# Patient Record
Sex: MALE | Race: WHITE | Employment: UNEMPLOYED | URBAN - METROPOLITAN AREA
[De-identification: names, ages, dates, MRNs, and addresses within clinical notes are randomized per-mention and may not be internally consistent; named-entity substitution may affect disease eponyms.]

---

## 2017-04-04 ENCOUNTER — TRANSFERRED RECORDS (OUTPATIENT)
Dept: HEALTH INFORMATION MANAGEMENT | Facility: CLINIC | Age: 22
End: 2017-04-04

## 2017-09-29 NOTE — PROGRESS NOTES
"  SUBJECTIVE:   Rex Rivera is a 22 year old male who presents to clinic today for the following health issues:  {Provider please address medication reconciliation discrepancies--rooming staff please delete if no med/rec issues}      {Superlists:173671}    {additional problems for provider to add:296277}    Problem list and histories reviewed & adjusted, as indicated.  Additional history: {NONE - AS DOCUMENTED:071841::\"as documented\"}    {HIST REVIEW/ LINKS 2:869372}    Reviewed and updated as needed this visit by clinical staff       Reviewed and updated as needed this visit by Provider         {PROVIDER CHARTING PREFERENCE:125087}    "

## 2017-10-04 ENCOUNTER — TELEPHONE (OUTPATIENT)
Dept: FAMILY MEDICINE | Facility: CLINIC | Age: 22
End: 2017-10-04

## 2017-10-04 ENCOUNTER — OFFICE VISIT (OUTPATIENT)
Dept: FAMILY MEDICINE | Facility: CLINIC | Age: 22
End: 2017-10-04
Payer: COMMERCIAL

## 2017-10-04 VITALS
DIASTOLIC BLOOD PRESSURE: 82 MMHG | RESPIRATION RATE: 16 BRPM | TEMPERATURE: 98.5 F | SYSTOLIC BLOOD PRESSURE: 110 MMHG | OXYGEN SATURATION: 99 % | WEIGHT: 161.2 LBS | HEIGHT: 71 IN | HEART RATE: 87 BPM | BODY MASS INDEX: 22.57 KG/M2

## 2017-10-04 DIAGNOSIS — Z00.01 ENCOUNTER FOR ROUTINE ADULT HEALTH EXAMINATION WITH ABNORMAL FINDINGS: Primary | ICD-10-CM

## 2017-10-04 DIAGNOSIS — R63.0 DECREASED APPETITE: ICD-10-CM

## 2017-10-04 DIAGNOSIS — R41.840 DIFFICULTY CONCENTRATING: ICD-10-CM

## 2017-10-04 DIAGNOSIS — D69.6 THROMBOCYTOPENIA (H): ICD-10-CM

## 2017-10-04 DIAGNOSIS — G47.9 SLEEP DISTURBANCE: ICD-10-CM

## 2017-10-04 LAB
BASOPHILS # BLD AUTO: 0 10E9/L (ref 0–0.2)
BASOPHILS NFR BLD AUTO: 0.4 %
DIFFERENTIAL METHOD BLD: ABNORMAL
EOSINOPHIL # BLD AUTO: 0.1 10E9/L (ref 0–0.7)
EOSINOPHIL NFR BLD AUTO: 2.5 %
ERYTHROCYTE [DISTWIDTH] IN BLOOD BY AUTOMATED COUNT: 11.9 % (ref 10–15)
HCT VFR BLD AUTO: 43.2 % (ref 40–53)
HGB BLD-MCNC: 15.3 G/DL (ref 13.3–17.7)
LYMPHOCYTES # BLD AUTO: 2 10E9/L (ref 0.8–5.3)
LYMPHOCYTES NFR BLD AUTO: 42.7 %
MCH RBC QN AUTO: 32.8 PG (ref 26.5–33)
MCHC RBC AUTO-ENTMCNC: 35.4 G/DL (ref 31.5–36.5)
MCV RBC AUTO: 93 FL (ref 78–100)
MONOCYTES # BLD AUTO: 0.6 10E9/L (ref 0–1.3)
MONOCYTES NFR BLD AUTO: 12.1 %
NEUTROPHILS # BLD AUTO: 2 10E9/L (ref 1.6–8.3)
NEUTROPHILS NFR BLD AUTO: 42.3 %
PLATELET # BLD AUTO: 117 10E9/L (ref 150–450)
RBC # BLD AUTO: 4.67 10E12/L (ref 4.4–5.9)
WBC # BLD AUTO: 4.7 10E9/L (ref 4–11)

## 2017-10-04 PROCEDURE — 80050 GENERAL HEALTH PANEL: CPT | Performed by: FAMILY MEDICINE

## 2017-10-04 PROCEDURE — 99385 PREV VISIT NEW AGE 18-39: CPT | Performed by: FAMILY MEDICINE

## 2017-10-04 PROCEDURE — 36415 COLL VENOUS BLD VENIPUNCTURE: CPT | Performed by: FAMILY MEDICINE

## 2017-10-04 RX ORDER — DEXTROAMPHETAMINE SACCHARATE, AMPHETAMINE ASPARTATE MONOHYDRATE, DEXTROAMPHETAMINE SULFATE AND AMPHETAMINE SULFATE 7.5; 7.5; 7.5; 7.5 MG/1; MG/1; MG/1; MG/1
30 CAPSULE, EXTENDED RELEASE ORAL
COMMUNITY

## 2017-10-04 ASSESSMENT — ANXIETY QUESTIONNAIRES
7. FEELING AFRAID AS IF SOMETHING AWFUL MIGHT HAPPEN: SEVERAL DAYS
3. WORRYING TOO MUCH ABOUT DIFFERENT THINGS: NEARLY EVERY DAY
GAD7 TOTAL SCORE: 9
GAD7 TOTAL SCORE: 9
4. TROUBLE RELAXING: NOT AT ALL
GAD7 TOTAL SCORE: 9
5. BEING SO RESTLESS THAT IT IS HARD TO SIT STILL: NOT AT ALL
2. NOT BEING ABLE TO STOP OR CONTROL WORRYING: MORE THAN HALF THE DAYS
7. FEELING AFRAID AS IF SOMETHING AWFUL MIGHT HAPPEN: SEVERAL DAYS
1. FEELING NERVOUS, ANXIOUS, OR ON EDGE: NEARLY EVERY DAY
6. BECOMING EASILY ANNOYED OR IRRITABLE: NOT AT ALL

## 2017-10-04 ASSESSMENT — PATIENT HEALTH QUESTIONNAIRE - PHQ9
10. IF YOU CHECKED OFF ANY PROBLEMS, HOW DIFFICULT HAVE THESE PROBLEMS MADE IT FOR YOU TO DO YOUR WORK, TAKE CARE OF THINGS AT HOME, OR GET ALONG WITH OTHER PEOPLE: VERY DIFFICULT
SUM OF ALL RESPONSES TO PHQ QUESTIONS 1-9: 7
SUM OF ALL RESPONSES TO PHQ QUESTIONS 1-9: 7

## 2017-10-04 ASSESSMENT — PAIN SCALES - GENERAL: PAINLEVEL: NO PAIN (0)

## 2017-10-04 NOTE — PROGRESS NOTES
SUBJECTIVE:   CC: Rex Rivera is an 22 year old male who presents for preventative health visit with friend.     HPI   Physical   Annual:     Getting at least 3 servings of Calcium per day::  Yes    Bi-annual eye exam::  NO    Dental care twice a year::  Yes    Sleep apnea or symptoms of sleep apnea::  None    Diet::  Breakfast skipped and Other    Frequency of exercise::  4-5 days/week    Duration of exercise::  Greater than 60 minutes    Taking medications regularly::  Yes    Medication side effects::  Other    Additional concerns today::  YES    ADHD  Patient feels that his Adderall helps him live day to day, but he doesn't feel like it makes him feel better overall. He is not taking it every day. Patient is open to changing his medication He was diagnosed in 2nd grade, and he started medication in high school. Patient talked to a provider about ADHD and then he started treatment, however he is not sure if he had a formal evaluation completed. Patient has noticed that he will wakeup feeling productive to organize items at times. He has been having trouble sleeping for 4 years sleeping about 3 hours per night. He remembers staying up at night late till 2:00am when he was a child. His friend would like for him to finish his degree and get a part time job. She has noticed since he moved in with them that he stays up all night and sleeps during the day while they are at work. She is wondering if smoking cannabis affects how he feels. He has stopped smoking cannabis as of today. Patient relates that he does not like to drink alcohol because it does not make him feel good. Reports his parents had issues with alcohol. He does eat dinner with them. She is concerned about him.     Skin   Patient has a lump on the left side of his neck.     HENT  He feels pain in specific areas of his head at times that waxes and weans. It feels like he has to stop was he is doing to close his eyes.     Patient experiences ringing in his  ears for about a year. He has not noticed hearing issues. He listens to music.     Cardiovascular  He reports occasional issues with chest discomfort. He plays baseball and this is not an issue then. It can happen at rest. No history of heart issues.     Illness  Patient expresses having heat spells at baseball practice since before this summer. He would feel nauseas. He was able to breath through his discomfort and sometimes having to vomit helped.         Today's PHQ-2 Score:   PHQ-2 ( 1999 Pfizer) 10/4/2017   Q1: Little interest or pleasure in doing things 1   Q2: Feeling down, depressed or hopeless 1   PHQ-2 Score 2       Abuse: Current or Past(Physical, Sexual or Emotional)- No  Do you feel safe in your environment - Yes    Social History   Substance Use Topics     Smoking status: Never Smoker     Smokeless tobacco: Never Used     Alcohol use Yes      Comment: occasionally          Standardized Alcohol Screening Questionnaire  AUDIT   Questions 0 1 2 3 4 Score   1. How often do you have a drink  containing alcohol? Never Monthly or less 2 to 4  times a  month 2 to 3  times a  week 4 or more  times a  week  0   2. How many drinks containing alcohol  do you have on a typical day when you are drinking? 1 or 2 3 or 4 5 or 6 7 to 9 10 or more  0   3. How often do you have more than five  or more drinks on one occasion? Never Less  than  monthly Monthly Weekly Daily or  almost  daily  0   4. How often during the last year have  you found that you were not able to stop drinking once you had started? Never Less  than  monthly Monthly Weekly Daily or  almost  daily  0   5. How often during the last year have  you failed to do what was normally expected of you because of drinking? Never Less  than  monthly Monthly Weekly Daily or  almost  daily  0   6. How often during the last year have  you needed a first drink in the morning to get yourself going after a heavy drinking session? Never Less  than  monthly Monthly Weekly  Daily or  almost  daily  0   7. How often during the last year have you had a feeling of guilt or remorse after drinking? Never Less  than  monthly Monthly Weekly Daily or  almost  daily  0   8. How often during the last year have  you been unable to remember what happened the night before because of your drinking? Never Less  than  monthly Monthly Weekly Daily or  almost  daily  0   9. Have you or someone else been  injured because of your drinking? No  Yes, but not in the last year  Yes,  during the  last year  0   10. Has a relative, friend, doctor or other health care worker been concerned about your drinking or suggested you cut down? No  Yes, but not in the last year  Yes,  during the  last year  0   Total  0   Scoring: A score of 7 for adult men is an indication of hazardous drinking (risk for physical or physiological harm); a score of 8 or more is an indication of an alcohol use disorder. A score of 5 or more for adult women  is an indication of hazardous drinking or an alcohol use disorder.         Last PSA: No results found for: PSA    Reviewed orders with patient. Reviewed health maintenance and updated orders accordingly - Yes  BP Readings from Last 3 Encounters:   10/04/17 110/82    Wt Readings from Last 3 Encounters:   10/04/17 73.1 kg (161 lb 3.2 oz)   02/01/02 25.5 kg (56 lb 4 oz) (83 %)*     * Growth percentiles are based on CDC 2-20 Years data.                      Reviewed and updated as needed this visit by clinical staffTobacco  Allergies  Meds  Med Hx  Surg Hx  Fam Hx  Soc Hx        Reviewed and updated as needed this visit by Provider        History reviewed. No pertinent past medical history.   Past Surgical History:   Procedure Laterality Date     CYSTECTOMY PILONIDAL         ROS:  C: NEGATIVE for fever, chills, change in weight  I: NEGATIVE for worrisome rashes, moles or lesions. See HPI above.   E: NEGATIVE for vision changes or irritation  ENT: NEGATIVE for mouth and throat  "problems. POSITIVE for ear problems. See HPI above.   R: NEGATIVE for significant cough or SOB. See HPI above.   CV: POSITIVE for chest pain. No palpitations  GI: NEGATIVE for abdominal pain, heartburn, or change in bowel habits. POSITIVE for nausea. See HPI above.    male: negative for dysuria, hematuria, decreased urinary stream, erectile dysfunction, urethral discharge  M: NEGATIVE for significant arthralgias or myalgia  N: NEGATIVE for weakness, dizziness or paresthesias. See HPI above.   P: POSITIVE for changes in mood or affect. See HPI above.     This document serves as a record of the services and decisions personally performed and made by Arielle Nice MD. It was created on her behalf by Chio Brown, a trained medical scribe. The creation of this document is based on the provider's statements to the medical scribe.  Chio Brown 3:36 PM October 4, 2017    OBJECTIVE:   /82  Pulse 87  Temp 98.5  F (36.9  C) (Temporal)  Resp 16  Ht 1.81 m (5' 11.26\")  Wt 73.1 kg (161 lb 3.2 oz)  SpO2 99%  BMI 22.32 kg/m2    EXAM:  GENERAL: healthy, alert and no distress  EYES: Eyes grossly normal to inspection, PERRL and conjunctivae and sclerae normal  HENT: ear canals and TM's normal, nose and mouth without ulcers or lesions  NECK: no adenopathy, no asymmetry, masses, or scars and thyroid normal to palpation  RESP: lungs clear to auscultation - no rales, rhonchi or wheezes  CV: regular rate and rhythm, normal S1 S2, no S3 or S4, no murmur, click or rub, no peripheral edema and peripheral pulses strong  ABDOMEN: soft, nontender, no hepatosplenomegaly, no masses and bowel sounds normal  MS: no gross musculoskeletal defects noted, no edema  SKIN: no suspicious lesions or rashes  NEURO: Normal strength and tone, mentation intact and speech normal  PSYCH: patient with odd affect. Makes eye contact at times.     ASSESSMENT/PLAN:   Routine physical exam with abnormal findings  See counseling messages     1. " Sleep disturbance  Patient is having trouble sleeping. Difficulty sleeping during the night and having issues with falling asleep during the day  Will check labs to rule out other findings. Awaiting results  Patient and his friend think this is the medication but also notices this when he hasn't taken the medication.   - CBC with platelets and differential  - Comprehensive metabolic panel  - TSH with free T4 reflex    2. Decreased appetite  Patient has not been eating much. Poor appetite while on the medication.   Doesn't try to eat.   Likely related to the adderall.   Will notify of results.  Trial off the medication.   - CBC with platelets and differential  - Comprehensive metabolic panel  - TSH with free T4 reflex    3. Difficulty concentrating  Discussed previous ADHD care with patient. He expresses taking Adderall for years, but has noticed that it helps with school but does not feel well.  According to his records he was not given a formal evaluation for diagnosis. He discloses that he smokes cannabis, and has discontinued use just today. He does not drink alcohol. Determine it best for patient to see Neuropsychology for full evaluation. Will determine based on results what diagnosis to treat for. At that time, will determine what medication should be used to manage symptoms.Routine drug screenings and a formal agreement will occur if treatment requires a controlled substance. Advised patient he will not be prescribed a controlled substance if he tests positive for any drug use. Strongly counseled patient that he cannot use cannabis anymore. Recommend to limit alcohol intake as he is of age.  In the meantime, encouraged patient to stick to a routine, write tasks down, stay active, and eat well to manage symptoms on his own until a formal evaluation is completed. Encouraged patient to start counseling as well. Warned to return to clinic if symptoms worsen.     - CBC with platelets and differential  -  "Comprehensive metabolic panel  - TSH with free T4 reflex  - NEUROPSYCHOLOGY REFERRAL    Offered patient Flu shot and to update immunizations, deferred by patient.     COUNSELING:   Reviewed preventive health counseling, as reflected in patient instructions       Regular exercise       Healthy diet/nutrition       Vision screening       Hearing screening       Drug use, Alcohol use           reports that he has never smoked. He has never used smokeless tobacco.      Estimated body mass index is 22.32 kg/(m^2) as calculated from the following:    Height as of this encounter: 1.81 m (5' 11.26\").    Weight as of this encounter: 73.1 kg (161 lb 3.2 oz).   Weight management plan noted, stable and monitoring    Counseling Resources:  ATP IV Guidelines  Pooled Cohorts Equation Calculator  FRAX Risk Assessment  ICSI Preventive Guidelines  Dietary Guidelines for Americans, 2010  USDA's MyPlate  ASA Prophylaxis  Lung CA Screening    Follow Up: Patient will return to clinic pending formal evaluation with Neuropsychology, otherwise as needed.    All questions invited, asked and answered to the patient's apparent satisfaction.  Patient agrees to plan.     The information in this document, created by the medical scribe for me, accurately reflects the services I personally performed and the decisions made by me. I have reviewed and approved this document for accuracy prior to leaving the patient care area.  October 4, 2017 3:47 PM    LEEANNA GILLIS MD, MD  [facility]  Answers for HPI/ROS submitted by the patient on 10/4/2017   If you checked off any problems, how difficult have these problems made it for you to do your work, take care of things at home, or get along with other people?: Very difficult  PHQ9 TOTAL SCORE: 7  JULISA 7 TOTAL SCORE: 9  PHQ-2 Score: 2    "
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
## 2017-10-04 NOTE — TELEPHONE ENCOUNTER
Sherrill called back informing us that shahid is scheduling out for 3 months and she left a message with penelope and is awaiting a call back from them. - mookie

## 2017-10-04 NOTE — TELEPHONE ENCOUNTER
Left message for Sherrill (who initially called) and explained that there is no C2C on file.  Please have alix call back.  Can transfer call to Ray

## 2017-10-04 NOTE — NURSING NOTE
"Chief Complaint   Patient presents with     A.D.H.D     Panel Management     Tdap, flu shot      Physical       Initial /82  Pulse 87  Temp 98.5  F (36.9  C) (Temporal)  Resp 16  Ht 5' 11.26\" (1.81 m)  Wt 161 lb 3.2 oz (73.1 kg)  SpO2 99%  BMI 22.32 kg/m2 Estimated body mass index is 22.32 kg/(m^2) as calculated from the following:    Height as of this encounter: 5' 11.26\" (1.81 m).    Weight as of this encounter: 161 lb 3.2 oz (73.1 kg).  Medication Reconciliation: complete   April JOHN Whelan      "

## 2017-10-04 NOTE — TELEPHONE ENCOUNTER
Reason for Call:  Other call back    Detailed comments: Sherrill is calling she tried to schedule appt at [facility] and it will take until January for him to be seen.  She left a message at the other number you gave her.  She just wants to clarify with you that she is scheduling the right thing.  Please call    Phone Number Patient can be reached at: Other phone number:  [phone]    Best Time: any    Can we leave a detailed message on this number? YES    Call taken on 10/4/2017 at 4:34 PM by Edwige Hines

## 2017-10-04 NOTE — MR AVS SNAPSHOT
After Visit Summary   10/4/2017    Rex Rivera    MRN: [id number]           Patient Information     Date Of Birth          1995        Visit Information        Provider Department      10/4/2017 2:20 PM Arielle Nice MD [facility]        Today's Diagnoses     Sleep disturbance    -  1    Decreased appetite        Difficulty concentrating           Follow-ups after your visit        Additional Services     NEUROPSYCHOLOGY REFERRAL       Your provider has referred you to:    [facility] [place] [phone]   [email or web address]  [facility]    All scheduling is subject to the client's specific insurance plan & benefits, provider/location availability, and provider clinical specialities.  Please arrive 15 minutes early for your first appointment and bring your completed paperwork.    Please be aware that coverage of these services is subject to the terms and limitations of your health insurance plan.  Call member services at your health plan with any benefit or coverage questions.    Please bring the following to your appointment:  >>   Any x-rays, CTs or MRIs which have been performed.  Contact the facility where they were done to arrange for  prior to your scheduled appointment.  Any new CT, MRI or other procedures ordered by your specialist must be performed at a [facility] facility or coordinated by your clinic's referral office.    >>   List of current medications   >>   This referral request   >>   Any documents/labs given to you for this referral                  Who to contact     If you have questions or need follow up information about today's clinic visit or your schedule please contact [facility] directly at [phone].  Normal or non-critical lab and imaging results will be communicated to you by MyChart, letter or phone within 4 business days after the clinic has received the results. If you  "do not hear from us within 7 days, please contact the clinic through [employer] or phone. If you have a critical or abnormal lab result, we will notify you by phone as soon as possible.  Submit refill requests through [employer] or call your pharmacy and they will forward the refill request to us. Please allow 3 business days for your refill to be completed.          Additional Information About Your Visit        [email or web address] Information     [employer] lets you send messages to your doctor, view your test results, renew your prescriptions, schedule appointments and more. To sign up, go to [email or web address] . Click on \"Log in\" on the left side of the screen, which will take you to the Welcome page. Then click on \"Sign up Now\" on the right side of the page.     You will be asked to enter the access code listed below, as well as some personal information. Please follow the directions to create your username and password.     Your access code is: [email or web address]  Expires: 2018  3:28 PM     Your access code will  in 90 days. If you need help or a new code, please call your [facility] clinic or [phone].        Care EveryWhere ID     This is your Care EveryWhere ID. This could be used by other organizations to access your [facility] medical records  [id number]        Your Vitals Were     Pulse Temperature Respirations Height Pulse Oximetry BMI (Body Mass Index)    87 98.5  F (36.9  C) (Temporal) 16 5' 11.26\" (1.81 m) 99% 22.32 kg/m2       Blood Pressure from Last 3 Encounters:   10/04/17 110/82    Weight from Last 3 Encounters:   10/04/17 161 lb 3.2 oz (73.1 kg)   02 56 lb 4 oz (25.5 kg) (83 %)*     * Growth percentiles are based on CDC 2-20 Years data.              We Performed the Following     CBC with platelets and differential     Comprehensive metabolic panel     NEUROPSYCHOLOGY REFERRAL     TSH with free T4 reflex        Primary Care Provider Office Phone # Fax #    Jt Uribe [phone] "       NO INFO FOUND [address] [place] [address]  XXX [place] [address]        Equal Access to Services     RAMYAHUY ALONA : Hadii barbi ku hadlazarosunday Sokhadijah, wachadwickda luqadaha, qatodta kadeannada stephanlesterturner, waxjovani keisha yasmaniadeline rothmangenesis rosaseduar weir. So [facility] [phone].    ATENCIÓN: Si habla español, tiene a rao disposición servicios gratuitos de asistencia lingüística. Llame al [phone].    We comply with applicable federal civil rights laws and [place] laws. We do not discriminate on the basis of race, color, national origin, age, disability, sex, sexual orientation, or gender identity.            Thank you!     Thank you for choosing [facility]  for your care. Our goal is always to provide you with excellent care. Hearing back from our patients is one way we can continue to improve our services. Please take a few minutes to complete the written survey that you may receive in the mail after your visit with us. Thank you!             Your Updated Medication List - Protect others around you: Learn how to safely use, store and throw away your medicines at www.disposemymeds.org.          This list is accurate as of: 10/4/17  3:28 PM.  Always use your most recent med list.                   Brand Name Dispense Instructions for use Diagnosis    amphetamine-dextroamphetamine 30 MG per 24 hr capsule    ADDERALL XR     Take 30 mg by mouth

## 2017-10-05 ENCOUNTER — TELEPHONE (OUTPATIENT)
Dept: FAMILY MEDICINE | Facility: CLINIC | Age: 22
End: 2017-10-05

## 2017-10-05 LAB
ALBUMIN SERPL-MCNC: 4.2 G/DL (ref 3.4–5)
ALP SERPL-CCNC: 87 U/L (ref 40–150)
ALT SERPL W P-5'-P-CCNC: 24 U/L (ref 0–70)
ANION GAP SERPL CALCULATED.3IONS-SCNC: 11 MMOL/L (ref 3–14)
AST SERPL W P-5'-P-CCNC: 14 U/L (ref 0–45)
BILIRUB SERPL-MCNC: 0.9 MG/DL (ref 0.2–1.3)
BUN SERPL-MCNC: 12 MG/DL (ref 7–30)
CALCIUM SERPL-MCNC: 9.5 MG/DL (ref 8.5–10.1)
CHLORIDE SERPL-SCNC: 102 MMOL/L (ref 94–109)
CO2 SERPL-SCNC: 27 MMOL/L (ref 20–32)
CREAT SERPL-MCNC: 0.98 MG/DL (ref 0.66–1.25)
GFR SERPL CREATININE-BSD FRML MDRD: >90 ML/MIN/1.7M2
GLUCOSE SERPL-MCNC: 97 MG/DL (ref 70–99)
POTASSIUM SERPL-SCNC: 3.9 MMOL/L (ref 3.4–5.3)
PROT SERPL-MCNC: 7.6 G/DL (ref 6.8–8.8)
SODIUM SERPL-SCNC: 140 MMOL/L (ref 133–144)
TSH SERPL DL<=0.005 MIU/L-ACNC: 2.76 MU/L (ref 0.4–4)

## 2017-10-05 ASSESSMENT — PATIENT HEALTH QUESTIONNAIRE - PHQ9: SUM OF ALL RESPONSES TO PHQ QUESTIONS 1-9: 7

## 2017-10-05 ASSESSMENT — ANXIETY QUESTIONNAIRES: GAD7 TOTAL SCORE: 9

## 2017-10-05 NOTE — TELEPHONE ENCOUNTER
Left message asking patient to return call.  Please inform patient of RESULTS from Provider below.       Please notify of results. No anemia. Mildly low platelets. No specific concern with this. Some medications can cause this. Sometimes a viral illness in the recent past.  Recommend recheck of this in 2-3 weeks with lab visit. If noticing increased bruising or bleeding, would recheck sooner.     Normal kidney and liver function. Normal electrolytes. Normal thyroid.     Please let me know what questions you have.     Arielle Nice MD

## 2017-10-05 NOTE — LETTER
[facility]  [address]  [place] [address]  [phone]      October 6, 2017    Rex Rivera                                                                                                                     [address] [place] [address]        Dear Rex,    We attempted to contact you by telephone without success.  The following are results from your Provider:  There is no anemia. Mildly low platelets. No specific concern with this. Some medications can cause this. Sometimes a viral illness in the recent past.  Recommend recheck of this in 2-3 weeks with lab visit. If noticing increased bruising or bleeding, would recheck sooner.     Normal kidney and liver function. Normal electrolytes. Normal thyroid.     Please let me know what questions you have.     Sincerely,  Arielle Nice MD       cah

## 2017-10-16 ENCOUNTER — TELEPHONE (OUTPATIENT)
Dept: FAMILY MEDICINE | Facility: CLINIC | Age: 22
End: 2017-10-16

## 2017-10-16 ENCOUNTER — ALLIED HEALTH/NURSE VISIT (OUTPATIENT)
Dept: FAMILY MEDICINE | Facility: CLINIC | Age: 22
End: 2017-10-16
Payer: COMMERCIAL

## 2017-10-16 DIAGNOSIS — Z23 NEED FOR PROPHYLACTIC VACCINATION AND INOCULATION AGAINST INFLUENZA: ICD-10-CM

## 2017-10-16 DIAGNOSIS — Z23 ENCOUNTER FOR IMMUNIZATION: Primary | ICD-10-CM

## 2017-10-16 PROCEDURE — 90686 IIV4 VACC NO PRSV 0.5 ML IM: CPT

## 2017-10-16 PROCEDURE — 90472 IMMUNIZATION ADMIN EACH ADD: CPT

## 2017-10-16 PROCEDURE — 90714 TD VACC NO PRESV 7 YRS+ IM: CPT

## 2017-10-16 PROCEDURE — 99207 ZZC NO CHARGE NURSE ONLY: CPT

## 2017-10-16 PROCEDURE — 90471 IMMUNIZATION ADMIN: CPT

## 2017-10-16 NOTE — TELEPHONE ENCOUNTER
Spoke with Donna. She has multiple questions re: patient.      1. He has appt with [facility] on 11/10.  Provider told pt to NOT take adderall (to clean out his system).  He has been taking it 2 days a week when he is in school.  Donna has seen a decline in his behavior and routine since he has not been taking it every day.  He stays in his room.  Not eating frequently.  Donna is wondering if he can go back to taking it every day?  Can he be evaluated at [facility] with adderall in his system?    2. Once [facility] evaluates him, how soon therefter will Dr. Nice get the results of the visit?  Donna wants to schedule an appt ASAP with SF after the [facility] eval, to get him on a consistent medication. When should she plan for that?    3. What suggestions does SF have for Donna - how to get through the next 3 weeks until the [facility] appt.  She states pt said he last smoked pot 10/4 and she believes him.  He takes the adderall twice a week on school days.  He is distant and keeps to himself and unmotivated.  Any advice.    Informed her that we do not have a C2C on file to discuss pt's information with her.  She will have Rex come to the clinic to complete one.  C2C placed at  for pt to complete.

## 2017-10-16 NOTE — TELEPHONE ENCOUNTER
Reason for Call:  Other questions    Detailed comments: patient's friend Jaycee is call, patient was seen on 10/04/2017 and Dr Nice told Jaycee that if she notices any changes with Rex to give her a call. Jaycee states she has some question she didn't give any other information. Please call her at either [phone] or [phone].        Phone Number Patient can be reached at: Other phone number:  n/a    Best Time: anytime    Can we leave a detailed message on this number? YES    Call taken on 10/16/2017 at 8:07 AM by Berenice Gross

## 2017-10-16 NOTE — PROGRESS NOTES
Injectable Influenza Immunization Documentation    1.  Is the person to be vaccinated sick today?   No    2. Does the person to be vaccinated have an allergy to a component   of the vaccine?   No    3. Has the person to be vaccinated ever had a serious reaction   to influenza vaccine in the past?   No    4. Has the person to be vaccinated ever had Guillain-Barré syndrome?   No    Form completed by Debora Whelan CMA      Screening Questionnaire for Adult Immunization    Are you sick today?   No   Do you have allergies to medications, food, a vaccine component or latex?   No   Have you ever had a serious reaction after receiving a vaccination?   No   Do you have a long-term health problem with heart disease, lung disease, asthma, kidney disease, metabolic disease (e.g. diabetes), anemia, or other blood disorder?   No   Do you have cancer, leukemia, HIV/AIDS, or any other immune system problem?   No   In the past 3 months, have you taken medications that affect  your immune system, such as prednisone, other steroids, or anticancer drugs; drugs for the treatment of rheumatoid arthritis, Crohn s disease, or psoriasis; or have you had radiation treatments?   No   Have you had a seizure, or a brain or other nervous system problem?   No   During the past year, have you received a transfusion of blood or blood     products, or been given immune (gamma) globulin or antiviral drug?   No   For women: Are you pregnant or is there a chance you could become        pregnant during the next month?   No   Have you received any vaccinations in the past 4 weeks?   No     Immunization questionnaire answers were all negative.        Per orders of Dr. Tran, injection of TD  given by Debora Whelan. Patient instructed to remain in clinic for 15 minutes afterwards, and to report any adverse reaction to me immediately.       Screening performed by Debora Whelan on 10/16/2017 at 3:49 PM.

## 2017-10-16 NOTE — MR AVS SNAPSHOT
"              After Visit Summary   10/16/2017    Rex Rivera    MRN: [id number]           Patient Information     Date Of Birth          1995        Visit Information        Provider Department      10/16/2017 3:30 PM RG [facility] [place] [facility]        Today's Diagnoses     Encounter for immunization    -  1    Need for prophylactic vaccination and inoculation against influenza           Follow-ups after your visit        Who to contact     If you have questions or need follow up information about today's clinic visit or your schedule please contact [facility] directly at [phone].  Normal or non-critical lab and imaging results will be communicated to you by [place], letter or phone within 4 business days after the clinic has received the results. If you do not hear from us within 7 days, please contact the clinic through [employer] or phone. If you have a critical or abnormal lab result, we will notify you by phone as soon as possible.  Submit refill requests through [employer] or call your pharmacy and they will forward the refill request to us. Please allow 3 business days for your refill to be completed.          Additional Information About Your Visit        MyChart Information     [employer] lets you send messages to your doctor, view your test results, renew your prescriptions, schedule appointments and more. To sign up, go to [email or web address] . Click on \"Log in\" on the left side of the screen, which will take you to the Welcome page. Then click on \"Sign up Now\" on the right side of the page.     You will be asked to enter the access code listed below, as well as some personal information. Please follow the directions to create your username and password.     Your access code is: [email or web address]  Expires: 2018  3:28 PM     Your access code will  in 90 days. If you need help or a new code, please call your [facility] or [phone].        Care EveryWhere ID     This " is your Care EveryWhere ID. This could be used by other organizations to access your [place] medical records  [id number]         Blood Pressure from Last 3 Encounters:   10/04/17 110/82    Weight from Last 3 Encounters:   10/04/17 161 lb 3.2 oz (73.1 kg)   02/01/02 56 lb 4 oz (25.5 kg) (83 %)*     * Growth percentiles are based on [facility] 2-20 Years data.              We Performed the Following     EA ADD'L VACCINE     FLU VAC, SPLIT VIRUS IM > 3 YO (QUADRIVALENT) [95685]     TD PRESERV FREE >=7 YRS ADS IM     Vaccine Administration, Initial [35220]        Primary Care Provider Office Phone # Fax #    Jt Uribe [phone]       NO INFO FOUND [address]  [place] [address]        Equal Access to Services     [facility]: Hadii aad ku hadasho Soomaali, waaxda luqadaha, qaybta kaalmada adeegyada, waxjovani martinezin haymarilynnn adegenesis gomes . So [facility] [phone].    ATENCIÓN: Si habla español, tiene a rao disposición servicios gratuitos de asistencia lingüística. [place] [phone].    We comply with applicable federal civil rights laws and [place] laws. We do not discriminate on the basis of race, color, national origin, age, disability, sex, sexual orientation, or gender identity.            Thank you!     Thank you for choosing [facility]  for your care. Our goal is always to provide you with excellent care. Hearing back from our patients is one way we can continue to improve our services. Please take a few minutes to complete the written survey that you may receive in the mail after your visit with us. Thank you!             Your Updated Medication List - Protect others around you: Learn how to safely use, store and throw away your medicines at www.disposemymeds.org.          This list is accurate as of: 10/16/17  3:51 PM.  Always use your most recent med list.                   Brand Name Dispense Instructions for use Diagnosis    amphetamine-dextroamphetamine 30 MG per 24 hr capsule     ADDERALL XR     Take 30 mg by mouth

## 2017-10-16 NOTE — TELEPHONE ENCOUNTER
Donna informed of notes from Provider below.    She stated that she spoke with [facility].  He was fine with patient taking adderall each day - but will need to stop taking it the day before his appt with [facility] so it is out of his system for the testing that they do.  Dr. Nice, are you ok with this?  Should pt follow your instructions or Vikas?

## 2017-10-16 NOTE — TELEPHONE ENCOUNTER
To obtain an accurate diagnosis, he needs to have the adderall out of his system.     The results will come from [facility]'s office when they have completed the analysis and notes. That is a good question for their office.     I am glad to hear that he is not smoking pot.  He will need to have drug testing completed prior to any prescriptions. He may be a good candidate for medicine that is not a stimulant if he is diagnosed with ADHD.     The next few weeks will be tough. He should be exercising, eating right to help in managing his health. He should also make sure he is taking notes in class, writing down assignments, etc.

## 2017-11-09 NOTE — PROGRESS NOTES
SUBJECTIVE:                                                    Rex Rivera is a 22 year old male who presents to clinic today for the following health issues with friend:      History of Present Illness     Depression & Anxiety Follow-up:     Depression/Anxiety:  Depression & Anxiety    Status since last visit::  Stable    Other associated symptoms of depression and anxiety::  None    Significant life event::  No    Current substance use::  Alcohol, Cannabis and Prescription Drugs    Diet:  Breakfast skipped  Frequency of exercise:  2-3 days/week  Duration of exercise:  15-30 minutes  Taking medications regularly:  Yes  Medication side effects:  None  Additional concerns today:  YES    Patient reports for follow up from his last visit on 10/04/2016. Patient states that since then home life has been very family oriented. He feels like he has a support system. School is manageable with 9 credits. He feels emotionally that he is doing alright. His symptoms are better than before. Patient does not feel like he is where he would like to feel or is comfortable with feeling. He has felt sadness, grief, and a mix of anxiousness and nervousness. Patient is still not sleeping well. It is the same as his last visit. It is not hard for him to get up. He stopped using marijuana for 18 days, but then he smoked, went sober for a week, and then did this 2 more times. He did go to Neuropsychology for a full evaluation of his symptoms. Patient brought in the evaluation document today. Patient's friend would like him to get a job. She is not sure if there is a medical reason for him to not be working. She is not sure if she is asking too much. She told him that he has until Thanksgiving to get a job, otherwise he will need to leave her home.       Problem list and histories reviewed & adjusted, as indicated.  Additional history: as documented    BP Readings from Last 3 Encounters:   11/15/17 102/74   10/04/17 110/82    Wt Readings  from Last 3 Encounters:   11/15/17 71.3 kg (157 lb 3.2 oz)   10/04/17 73.1 kg (161 lb 3.2 oz)   02/01/02 25.5 kg (56 lb 4 oz) (83 %)*     * Growth percentiles are based on [facility] 2-20 Years data.           ROS:  C: NEGATIVE for fever, chills, change in weight. See HPI above.   E/M: NEGATIVE for ear, mouth and throat problems  R: NEGATIVE for significant cough or SOB  CV: NEGATIVE for chest pain, palpitations or peripheral edema  NEURO: NEGATIVE for weakness, dizziness or paresthesias. See HPI above.   PSYCHIATRIC: POSITIVE for changes in mood or affect. See HPI above.     This document serves as a record of the services and decisions personally performed and made by Arielle Nice MD. It was created on her behalf by Chio Brown, a trained medical scribe. The creation of this document is based on the provider's statements to the medical scribe.  Chio Brown 9:25 AM November 15, 2017    OBJECTIVE:     /74  Pulse 75  Temp 97.4  F (36.3  C) (Temporal)  Resp 16  Wt 71.3 kg (157 lb 3.2 oz)  SpO2 99%  BMI 21.77 kg/m2  Body mass index is 21.77 kg/(m^2).  GENERAL APPEARANCE: healthy, alert and no distress  MENTAL STATUS EXAM:  Appearance/Behavior: No apparent distress and Casually groomed  Speech: Normal  Mood/Affect: normal affect  Insight: Adequate      Diagnostic Test Results:  No results found for this or any previous visit (from the past 24 hour(s)).    ASSESSMENT/PLAN:           1. Attention deficit disorder (ADD) in adult  Discussed depression and anxiety symptoms. Patient went to Neuropsychology for full evaluation of symptoms. Reviewed the evaluation. Neuropsychology recommended a sleep study. Patient is still using marijuana. Counseled patient on stopping marijuana usage. He is not a candidate for adderall with his substance use. Discussed a trial of wellbutrin or strattera. After discussion, will start Wellbutrin XL 150mg once daily. Medication direction, dosage, and side effects discussed with  patient. Advised patient that it may take 3 weeks to see maximum effectiveness.   - buPROPion (WELLBUTRIN XL) 150 MG 24 hr tablet; Take 1 tablet (150 mg) by mouth every morning  Dispense: 30 tablet; Refill: 1    2. Insomnia, unspecified type  Directed patient to schedule a sleep study. Advised patient that he may be eligible to have a home sleep study. Referral to Sleep Management as requested by patient. Patient's friend suggested for him to go to [facility].   - SLEEP EVALUATION & MANAGEMENT REFERRAL - ADULT -[facility]  [phone] (Age 15 and up); Future    3. Thrombocytopenia (H)  Awaiting results. Will notify with results.   - **CBC with platelets FUTURE 2mo    Follow Up: Return to clinic in 2-3 weeks for follow up.     All questions invited, asked and answered to the patient's apparent satisfaction.  Patient agrees to plan.     The information in this document, created by the medical scribe for me, accurately reflects the services I personally performed and the decisions made by me. I have reviewed and approved this document for accuracy prior to leaving the patient care area.  November 15, 2017 9:53 AM    LEEANNA GILLIS MD, MD  [facility]

## 2017-11-12 ENCOUNTER — TRANSFERRED RECORDS (OUTPATIENT)
Dept: HEALTH INFORMATION MANAGEMENT | Facility: CLINIC | Age: 22
End: 2017-11-12

## 2017-11-15 ENCOUNTER — TELEPHONE (OUTPATIENT)
Dept: FAMILY MEDICINE | Facility: CLINIC | Age: 22
End: 2017-11-15

## 2017-11-15 ENCOUNTER — OFFICE VISIT (OUTPATIENT)
Dept: FAMILY MEDICINE | Facility: CLINIC | Age: 22
End: 2017-11-15
Payer: COMMERCIAL

## 2017-11-15 VITALS
SYSTOLIC BLOOD PRESSURE: 102 MMHG | RESPIRATION RATE: 16 BRPM | DIASTOLIC BLOOD PRESSURE: 74 MMHG | OXYGEN SATURATION: 99 % | HEART RATE: 75 BPM | WEIGHT: 157.2 LBS | TEMPERATURE: 97.4 F | BODY MASS INDEX: 21.77 KG/M2

## 2017-11-15 DIAGNOSIS — F98.8 ATTENTION DEFICIT DISORDER (ADD) IN ADULT: Primary | ICD-10-CM

## 2017-11-15 DIAGNOSIS — D69.6 THROMBOCYTOPENIA (H): ICD-10-CM

## 2017-11-15 DIAGNOSIS — G47.00 INSOMNIA, UNSPECIFIED TYPE: ICD-10-CM

## 2017-11-15 LAB
ERYTHROCYTE [DISTWIDTH] IN BLOOD BY AUTOMATED COUNT: 11.7 % (ref 10–15)
HCT VFR BLD AUTO: 42.7 % (ref 40–53)
HGB BLD-MCNC: 14.9 G/DL (ref 13.3–17.7)
MCH RBC QN AUTO: 32.7 PG (ref 26.5–33)
MCHC RBC AUTO-ENTMCNC: 34.9 G/DL (ref 31.5–36.5)
MCV RBC AUTO: 94 FL (ref 78–100)
PLATELET # BLD AUTO: 127 10E9/L (ref 150–450)
RBC # BLD AUTO: 4.56 10E12/L (ref 4.4–5.9)
WBC # BLD AUTO: 6.4 10E9/L (ref 4–11)

## 2017-11-15 PROCEDURE — 99214 OFFICE O/P EST MOD 30 MIN: CPT | Performed by: FAMILY MEDICINE

## 2017-11-15 PROCEDURE — 85027 COMPLETE CBC AUTOMATED: CPT | Performed by: FAMILY MEDICINE

## 2017-11-15 PROCEDURE — 36415 COLL VENOUS BLD VENIPUNCTURE: CPT | Performed by: FAMILY MEDICINE

## 2017-11-15 RX ORDER — BUPROPION HYDROCHLORIDE 150 MG/1
150 TABLET ORAL EVERY MORNING
Qty: 30 TABLET | Refills: 1 | Status: SHIPPED | OUTPATIENT
Start: 2017-11-15

## 2017-11-15 ASSESSMENT — PAIN SCALES - GENERAL: PAINLEVEL: NO PAIN (0)

## 2017-11-15 NOTE — TELEPHONE ENCOUNTER
Patient had evaluation at [facility] for neuropsych evaluation. He is on at 9 am today and would like the evaluation. Do not have a copy of it.

## 2017-11-15 NOTE — TELEPHONE ENCOUNTER
Called [phone] [facility] - [facility] message asking them to fax the records to us asap.

## 2017-11-15 NOTE — TELEPHONE ENCOUNTER
Left message for patient to call clinic back. When patient calls back please give messasge below.  Please notify Rex that the platelets are a little improved.  No concerns. If he notices any different bruising or bleeding concerns, then would recheck at that point.     MD Leyla Medina MA

## 2017-11-15 NOTE — MR AVS SNAPSHOT
After Visit Summary   11/15/2017    Rex Rivera    MRN: [id number]           Patient Information     Date Of Birth          1995        Visit Information        Provider Department      11/15/2017 9:00 AM Arielle Nice MD Call [facility] Driver        Today's Diagnoses     Attention deficit disorder (ADD) in adult    -  1    Insomnia, unspecified type        Thrombocytopenia (H)          Care Instructions    Wellbutrin 150mg once daily. By 3 weeks will see maximum effect.     Schedule a sleep study.     Return to clinic in 2-3 weeks for follow up.           Follow-ups after your visit        Additional Services     SLEEP EVALUATION & MANAGEMENT REFERRAL - [facility]  [phone] (Age 15 and up)       Please be aware that coverage of these services is subject to the terms and limitations of your health insurance plan.  Call member services at your health plan with any benefit or coverage questions.      Please bring the following to your appointment:    >>   List of current medications   >>   This referral request   >>   Any documents/labs given to you for this referral                      Follow-up notes from your care team     Return in about 2 weeks (around 11/29/2017) for Follow Up .      Future tests that were ordered for you today     Open Future Orders        Priority Expected Expires Ordered    SLEEP EVALUATION & MANAGEMENT REFERRAL - [facility]  [phone] (Age 15 and up) Routine  11/15/2018 11/15/2017            Who to contact     If you have questions or need follow up information about today's clinic visit or your schedule please contact [facility] directly at [phone].  Normal or non-critical lab and imaging results will be communicated to you by MyChart, letter or phone within 4 business days after the clinic has received the results. If you do not hear from us within 7 days, please  "contact the clinic through [employer] or phone. If you have a critical or abnormal lab result, we will notify you by phone as soon as possible.  Submit refill requests through [employer] or call your pharmacy and they will forward the refill request to us. Please allow 3 business days for your refill to be completed.          Additional Information About Your Visit        [employer] Information     [employer] lets you send messages to your doctor, view your test results, renew your prescriptions, schedule appointments and more. To sign up, go to [email or web address] . Click on \"Log in\" on the left side of the screen, which will take you to the Welcome page. Then click on \"Sign up Now\" on the right side of the page.     You will be asked to enter the access code listed below, as well as some personal information. Please follow the directions to create your username and password.     Your access code is: [email or web address]  Expires: 2018  2:28 PM     Your access code will  in 90 days. If you need help or a new code, please call your [place] clinic or [phone].        Care EveryWhere ID     This is your Care EveryWhere ID. This could be used by other organizations to access your [place] medical records  [id number]        Your Vitals Were     Pulse Temperature Respirations Pulse Oximetry BMI (Body Mass Index)       75 97.4  F (36.3  C) (Temporal) 16 99% 21.77 kg/m2        Blood Pressure from Last 3 Encounters:   11/15/17 102/74   10/04/17 110/82    Weight from Last 3 Encounters:   11/15/17 157 lb 3.2 oz (71.3 kg)   10/04/17 161 lb 3.2 oz (73.1 kg)   02 56 lb 4 oz (25.5 kg) (83 %)*     * Growth percentiles are based on CDC 2-20 Years data.              We Performed the Following     **CBC with platelets FUTURE 2mo          Today's Medication Changes          These changes are accurate as of: 11/15/17  9:41 AM.  If you have any questions, ask your nurse or doctor.               Start taking these medicines.     "    Dose/Directions    buPROPion 150 MG 24 hr tablet   Commonly known as:  WELLBUTRIN XL   Used for:  Attention deficit disorder (ADD) in adult   Started by:  Arielle Nice MD        Dose:  150 mg   Take 1 tablet (150 mg) by mouth every morning   Quantity:  30 tablet   Refills:  1            Where to get your medicines      These medications were [facility] to [facility] - [place], [place] - [address] AT [facility] [address])  [address], [place] [facility] [address]     Phone:  [phone]     buPROPion 150 MG 24 hr tablet                Primary Care Provider Office Phone # Fax #    Arielle Nice [phone] [address] [facility] [address]        Equal Access to Services     HU[facility] SAGE : Hadii barbi blair hadasho Soomaali, waaxda luqadaha, qaybta kaalmada adeegyada, waxjovani valdes haymarilynnn nazario gomes . So [facility] [phone].    ATENCIÓN: Si habla español, tiene a rao disposición servicios gratuitos de asistencia lingüística. [facility] [phone].    We comply with applicable federal civil rights laws and [place] laws. We do not discriminate on the basis of race, color, national origin, age, disability, sex, sexual orientation, or gender identity.            Thank you!     Thank you for choosing [facility]  for your care. Our goal is always to provide you with excellent care. Hearing back from our patients is one way we can continue to improve our services. Please take a few minutes to complete the written survey that you may receive in the mail after your visit with us. Thank you!             Your Updated Medication List - Protect others around you: Learn how to safely use, store and throw away your medicines at www.disposemymeds.org.          This list is accurate as of: 11/15/17  9:41 AM.  Always use your most recent med list.                   Brand Name Dispense Instructions for use Diagnosis    amphetamine-dextroamphetamine 30  MG per 24 hr capsule    ADDERALL XR     Take 30 mg by mouth        buPROPion 150 MG 24 hr tablet    WELLBUTRIN XL    30 tablet    Take 1 tablet (150 mg) by mouth every morning    Attention deficit disorder (ADD) in adult

## 2017-11-15 NOTE — PATIENT INSTRUCTIONS
Wellbutrin 150mg once daily. By 3 weeks will see maximum effect.     Schedule a sleep study.     Return to clinic in 2-3 weeks for follow up.